# Patient Record
Sex: FEMALE | Race: ASIAN | NOT HISPANIC OR LATINO
[De-identification: names, ages, dates, MRNs, and addresses within clinical notes are randomized per-mention and may not be internally consistent; named-entity substitution may affect disease eponyms.]

---

## 2021-07-30 ENCOUNTER — TRANSCRIPTION ENCOUNTER (OUTPATIENT)
Age: 26
End: 2021-07-30

## 2023-11-23 ENCOUNTER — EMERGENCY (EMERGENCY)
Facility: HOSPITAL | Age: 28
LOS: 1 days | Discharge: HOME | End: 2023-11-23
Attending: EMERGENCY MEDICINE
Payer: COMMERCIAL

## 2023-11-23 VITALS
SYSTOLIC BLOOD PRESSURE: 116 MMHG | WEIGHT: 126.99 LBS | HEART RATE: 62 BPM | TEMPERATURE: 97 F | OXYGEN SATURATION: 98 % | DIASTOLIC BLOOD PRESSURE: 72 MMHG | HEIGHT: 60 IN | RESPIRATION RATE: 16 BRPM

## 2023-11-23 PROCEDURE — 99283 EMERGENCY DEPT VISIT LOW MDM: CPT

## 2023-11-23 PROCEDURE — 99284 EMERGENCY DEPT VISIT MOD MDM: CPT

## 2023-11-23 RX ORDER — ACETAMINOPHEN 500 MG
650 TABLET ORAL ONCE
Refills: 0 | Status: COMPLETED | OUTPATIENT
Start: 2023-11-23 | End: 2023-11-23

## 2023-11-23 RX ORDER — LIDOCAINE 4 G/100G
1 CREAM TOPICAL ONCE
Refills: 0 | Status: COMPLETED | OUTPATIENT
Start: 2023-11-23 | End: 2023-11-23

## 2023-11-23 RX ORDER — IBUPROFEN 200 MG
600 TABLET ORAL ONCE
Refills: 0 | Status: COMPLETED | OUTPATIENT
Start: 2023-11-23 | End: 2023-11-23

## 2023-11-23 RX ADMIN — Medication 600 MILLIGRAM(S): at 10:16

## 2023-11-23 RX ADMIN — Medication 650 MILLIGRAM(S): at 10:16

## 2023-11-23 RX ADMIN — LIDOCAINE 1 PATCH: 4 CREAM TOPICAL at 10:17

## 2023-11-23 NOTE — ED ADULT TRIAGE NOTE - ARRIVAL FROM
Patient cancelled appointment on 4/17/20 with Dr. Dontae Lyons for NPT dyspnea on exertion. Reason: COVID19. Offered VV but pt does not have a smart phone, nor does anyone in his household. Patient did reschedule appointment. Appointment rescheduled for 6/26/20. Work
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
## 2023-11-23 NOTE — ED PROVIDER NOTE - PATIENT PORTAL LINK FT
You can access the FollowMyHealth Patient Portal offered by [facility] by registering at the following website: [email or web address]. By joining [employer]’s FollowMyHealth portal, you will also be able to view your health information using other applications (apps) compatible with our system.
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
## 2023-11-23 NOTE — ED PROVIDER NOTE - PHYSICAL EXAMINATION
Exam:  General: Patient well appearing, vital signs within normal limits  HEENT: airway patent with moist mucous membranes  Cardiac: RRR S1/S2 with strong peripheral pulses  Respiratory: lungs clear without respiratory distress  GI: abdomen soft, non tender, non distended  Neuro: no gross neurologic deficits, CN II-XII intact, strength 5/5 all extremities, sensation to light touch intact  MSK: no midline spinal tenderness, no extremity deformity, normal ambulation  Skin: warm, well perfused  Psych: normal mood and affect

## 2024-04-30 ENCOUNTER — EMERGENCY (EMERGENCY)
Facility: HOSPITAL | Age: 29
LOS: 1 days | Discharge: HOME | End: 2024-04-30
Attending: EMERGENCY MEDICINE | Admitting: EMERGENCY MEDICINE
Payer: COMMERCIAL

## 2024-04-30 VITALS
SYSTOLIC BLOOD PRESSURE: 124 MMHG | OXYGEN SATURATION: 97 % | DIASTOLIC BLOOD PRESSURE: 72 MMHG | TEMPERATURE: 98 F | RESPIRATION RATE: 16 BRPM | HEART RATE: 69 BPM

## 2024-04-30 PROBLEM — Z78.9 OTHER SPECIFIED HEALTH STATUS: Chronic | Status: ACTIVE | Noted: 2023-11-23

## 2024-04-30 LAB
ALBUMIN SERPL ELPH-MCNC: 4.5 G/DL — SIGNIFICANT CHANGE UP (ref 3.3–5)
ALP SERPL-CCNC: 82 U/L — SIGNIFICANT CHANGE UP (ref 40–120)
ALT FLD-CCNC: 16 U/L — SIGNIFICANT CHANGE UP (ref 4–33)
ANION GAP SERPL CALC-SCNC: 13 MMOL/L — SIGNIFICANT CHANGE UP (ref 7–14)
APPEARANCE UR: ABNORMAL
AST SERPL-CCNC: 14 U/L — SIGNIFICANT CHANGE UP (ref 4–32)
BACTERIA # UR AUTO: NEGATIVE /HPF — SIGNIFICANT CHANGE UP
BASOPHILS # BLD AUTO: 0.03 K/UL — SIGNIFICANT CHANGE UP (ref 0–0.2)
BASOPHILS NFR BLD AUTO: 0.5 % — SIGNIFICANT CHANGE UP (ref 0–2)
BILIRUB SERPL-MCNC: 0.4 MG/DL — SIGNIFICANT CHANGE UP (ref 0.2–1.2)
BILIRUB UR-MCNC: NEGATIVE — SIGNIFICANT CHANGE UP
BUN SERPL-MCNC: 10 MG/DL — SIGNIFICANT CHANGE UP (ref 7–23)
CALCIUM SERPL-MCNC: 8.9 MG/DL — SIGNIFICANT CHANGE UP (ref 8.4–10.5)
CAST: 0 /LPF — SIGNIFICANT CHANGE UP (ref 0–4)
CHLORIDE SERPL-SCNC: 102 MMOL/L — SIGNIFICANT CHANGE UP (ref 98–107)
CO2 SERPL-SCNC: 23 MMOL/L — SIGNIFICANT CHANGE UP (ref 22–31)
COLOR SPEC: YELLOW — SIGNIFICANT CHANGE UP
CREAT SERPL-MCNC: 0.65 MG/DL — SIGNIFICANT CHANGE UP (ref 0.5–1.3)
DIFF PNL FLD: ABNORMAL
EGFR: 124 ML/MIN/1.73M2 — SIGNIFICANT CHANGE UP
EOSINOPHIL # BLD AUTO: 0.13 K/UL — SIGNIFICANT CHANGE UP (ref 0–0.5)
EOSINOPHIL NFR BLD AUTO: 2.1 % — SIGNIFICANT CHANGE UP (ref 0–6)
FLUAV AG NPH QL: SIGNIFICANT CHANGE UP
FLUBV AG NPH QL: SIGNIFICANT CHANGE UP
GLUCOSE SERPL-MCNC: 83 MG/DL — SIGNIFICANT CHANGE UP (ref 70–99)
GLUCOSE UR QL: NEGATIVE MG/DL — SIGNIFICANT CHANGE UP
HCG SERPL-ACNC: <1 MIU/ML — SIGNIFICANT CHANGE UP
HCT VFR BLD CALC: 38.2 % — SIGNIFICANT CHANGE UP (ref 34.5–45)
HGB BLD-MCNC: 13 G/DL — SIGNIFICANT CHANGE UP (ref 11.5–15.5)
IANC: 3.19 K/UL — SIGNIFICANT CHANGE UP (ref 1.8–7.4)
IMM GRANULOCYTES NFR BLD AUTO: 0.3 % — SIGNIFICANT CHANGE UP (ref 0–0.9)
KETONES UR-MCNC: NEGATIVE MG/DL — SIGNIFICANT CHANGE UP
LEUKOCYTE ESTERASE UR-ACNC: NEGATIVE — SIGNIFICANT CHANGE UP
LIDOCAIN IGE QN: 20 U/L — SIGNIFICANT CHANGE UP (ref 7–60)
LYMPHOCYTES # BLD AUTO: 2.19 K/UL — SIGNIFICANT CHANGE UP (ref 1–3.3)
LYMPHOCYTES # BLD AUTO: 36.1 % — SIGNIFICANT CHANGE UP (ref 13–44)
MCHC RBC-ENTMCNC: 29.9 PG — SIGNIFICANT CHANGE UP (ref 27–34)
MCHC RBC-ENTMCNC: 34 GM/DL — SIGNIFICANT CHANGE UP (ref 32–36)
MCV RBC AUTO: 87.8 FL — SIGNIFICANT CHANGE UP (ref 80–100)
MONOCYTES # BLD AUTO: 0.5 K/UL — SIGNIFICANT CHANGE UP (ref 0–0.9)
MONOCYTES NFR BLD AUTO: 8.3 % — SIGNIFICANT CHANGE UP (ref 2–14)
NEUTROPHILS # BLD AUTO: 3.19 K/UL — SIGNIFICANT CHANGE UP (ref 1.8–7.4)
NEUTROPHILS NFR BLD AUTO: 52.7 % — SIGNIFICANT CHANGE UP (ref 43–77)
NITRITE UR-MCNC: NEGATIVE — SIGNIFICANT CHANGE UP
NRBC # BLD: 0 /100 WBCS — SIGNIFICANT CHANGE UP (ref 0–0)
NRBC # FLD: 0 K/UL — SIGNIFICANT CHANGE UP (ref 0–0)
PH UR: 6 — SIGNIFICANT CHANGE UP (ref 5–8)
PLATELET # BLD AUTO: 300 K/UL — SIGNIFICANT CHANGE UP (ref 150–400)
POTASSIUM SERPL-MCNC: 3.7 MMOL/L — SIGNIFICANT CHANGE UP (ref 3.5–5.3)
POTASSIUM SERPL-SCNC: 3.7 MMOL/L — SIGNIFICANT CHANGE UP (ref 3.5–5.3)
PROT SERPL-MCNC: 7 G/DL — SIGNIFICANT CHANGE UP (ref 6–8.3)
PROT UR-MCNC: SIGNIFICANT CHANGE UP MG/DL
RBC # BLD: 4.35 M/UL — SIGNIFICANT CHANGE UP (ref 3.8–5.2)
RBC # FLD: 12.4 % — SIGNIFICANT CHANGE UP (ref 10.3–14.5)
RBC CASTS # UR COMP ASSIST: 4 /HPF — SIGNIFICANT CHANGE UP (ref 0–4)
REVIEW: SIGNIFICANT CHANGE UP
RSV RNA NPH QL NAA+NON-PROBE: SIGNIFICANT CHANGE UP
SARS-COV-2 RNA SPEC QL NAA+PROBE: SIGNIFICANT CHANGE UP
SODIUM SERPL-SCNC: 138 MMOL/L — SIGNIFICANT CHANGE UP (ref 135–145)
SP GR SPEC: 1.03 — SIGNIFICANT CHANGE UP (ref 1–1.03)
SQUAMOUS # UR AUTO: 7 /HPF — HIGH (ref 0–5)
UROBILINOGEN FLD QL: 0.2 MG/DL — SIGNIFICANT CHANGE UP (ref 0.2–1)
WBC # BLD: 6.06 K/UL — SIGNIFICANT CHANGE UP (ref 3.8–10.5)
WBC # FLD AUTO: 6.06 K/UL — SIGNIFICANT CHANGE UP (ref 3.8–10.5)
WBC UR QL: 0 /HPF — SIGNIFICANT CHANGE UP (ref 0–5)

## 2024-04-30 PROCEDURE — 93010 ELECTROCARDIOGRAM REPORT: CPT

## 2024-04-30 PROCEDURE — 99284 EMERGENCY DEPT VISIT MOD MDM: CPT

## 2024-04-30 PROCEDURE — 76705 ECHO EXAM OF ABDOMEN: CPT | Mod: 26

## 2024-04-30 RX ORDER — SODIUM CHLORIDE 9 MG/ML
1000 INJECTION INTRAMUSCULAR; INTRAVENOUS; SUBCUTANEOUS ONCE
Refills: 0 | Status: COMPLETED | OUTPATIENT
Start: 2024-04-30 | End: 2024-04-30

## 2024-04-30 RX ORDER — ACETAMINOPHEN 500 MG
650 TABLET ORAL ONCE
Refills: 0 | Status: COMPLETED | OUTPATIENT
Start: 2024-04-30 | End: 2024-04-30

## 2024-04-30 RX ADMIN — Medication 650 MILLIGRAM(S): at 15:27

## 2024-04-30 RX ADMIN — SODIUM CHLORIDE 1000 MILLILITER(S): 9 INJECTION INTRAMUSCULAR; INTRAVENOUS; SUBCUTANEOUS at 15:27

## 2024-04-30 NOTE — ED PROVIDER NOTE - PATIENT PORTAL LINK FT
You can access the FollowMyHealth Patient Portal offered by [facility] by registering at the following website: [email or web address]. By joining [employer]’s FollowMyHealth portal, you will also be able to view your health information using other applications (apps) compatible with our system.

## 2024-04-30 NOTE — ED PROVIDER NOTE - CLINICAL SUMMARY MEDICAL DECISION MAKING FREE TEXT BOX
28 y.o. F no PMHx, no surgeries, presenting with 6 days of intermittent epigastric abdominal pain. Physical exam notable for epigastric tenderness, and mild LUQ tenderness.  Otherwise no focal findings. Given HPI, concerning differentials include but not limited to cholecystitis, biliary colic, gastritis, GERD, PUD is considered however, pt denies hematemesis or melena.  ACS is considered however, pt is young with no cardiac risk factors and HPI without CP or SOB. As such low concern for this etiology at this time. Will obtain RUQ ultrasound to evaluate for possibly biliary etiology. Will obtain screening labs including CBC, CMP to to evaluate for infectious etiology, LFTs. Will obtain lipase to evaluate for pancreatitis. Will provide IV fluids and Tylenol for headache at this time.

## 2024-04-30 NOTE — ED PROVIDER NOTE - ATTENDING CONTRIBUTION TO CARE
DR. BERMEO, ATTENDING MD-  I performed a face to face bedside interview with the patient regarding history of present illness, review of symptoms and past medical history. I completed an independent physical exam.  I have discussed the patient's plan of care with the resident.   Documentation as above in the note.    29 y/o female with epig pain nausea subj fever x5 days.  Eval for pancreatitis gastritis cholecystitis cholelithiasis pregnancy.  Obtain ucg cbc cmp lipase ruq u/s give ivf bolus antiemetic pain med reassess.

## 2024-04-30 NOTE — ED PROVIDER NOTE - NSFOLLOWUPCLINICS_GEN_ALL_ED_FT
Gastroenterology at [facility]  Gastroenterology  [address] [place], [place] [address]  Phone: [phone]  Fax:   Follow Up Time: 1-3 Days

## 2024-04-30 NOTE — ED ADULT NURSE NOTE - CAS EDN DISCHARGE INTERVENTIONS
normal appearance , without tenderness upon palpation , no deformities , trachea midline , Thyroid normal size , no thyroid nodules , no masses , no JVD , thyroid nontender
IV discontinued, cath removed intact

## 2024-04-30 NOTE — ED ADULT TRIAGE NOTE - CHIEF COMPLAINT QUOTE
pt ambulatory into ED c/o epigastric pain x5 days, worse with eating. pt reports 1 episode of fever when symptoms began that has since subsided. also endorses nausea with no vomiting and multiple episodes of soft stool. denies past medical history.

## 2024-04-30 NOTE — ED ADULT NURSE NOTE - OBJECTIVE STATEMENT
28 year old female brought to room 5. pt ambulatory into ED c/o epigastric pain x5 days, worse with eating. pt reports 1 episode of fever when symptoms began that has since subsided. also endorses nausea with no vomiting and multiple episodes of soft stool. denies past medical history.   patient laying in semi fowlers position on the stretcher. patient alert and oriented times four. patient denies shortness of breath, chest pain, nausea, vomiting, chill, fever. Respirations equal and adequate. Patients 20 gauge right a.c  IV patent, no signs of infiltration. Safety measures in place, call bell within reach. Patient stable upon leaving the room.

## 2024-04-30 NOTE — ED PROVIDER NOTE - OBJECTIVE STATEMENT
28 y.o. F no PMHx, no surgeries, presenting with 6 days of intermittent epigastric abdominal pain. Patient states 5 PM on Thursday developing sudden onset of what she describes as "turning/cramping" non-radiating pain. She denies having this pain before. That night, patient endorses subjective fever, took Tylenol with resolution, has not felt febrile since. Does state pain is worse with food, no alleviating factors. Denies rhinorrhea, sore throat, coughing, recent travel or sick contacts, CP or SOB, dysuria, hematuria, diarrhea or blood in stools. Currently does endorse frontal headache.  No other complaints. Currently on menstrual cycle.

## 2024-04-30 NOTE — ED PROVIDER NOTE - PROGRESS NOTE DETAILS
Patient resting comfortably in bed.  Endorsing improvement in her symptoms. work up negative. will d/c. pt amenable to this plan

## 2024-04-30 NOTE — ED PROVIDER NOTE - PHYSICAL EXAMINATION
GEN: Patient awake and alert. No acute distress, non-toxic.  CARDIAC: RRR. Normal S1, S2. No murmur, rubs, or gallops.   PULM: CTA B/L no wheeze, rales or rhonchi. No signs of respiratory distress, no accessory muscle usage or nasal flaring.  ABD: Soft, tender in the epigastrium, nondistended  : No CVA tenderness, no suprapubic tenderness.  MSK: Moving all extremities spontaneously  NEURO: A&Ox3  SKIN: Warm, dry

## 2024-05-15 ENCOUNTER — TRANSCRIPTION ENCOUNTER (OUTPATIENT)
Age: 29
End: 2024-05-15

## 2024-05-15 ENCOUNTER — NON-APPOINTMENT (OUTPATIENT)
Age: 29
End: 2024-05-15

## 2024-05-15 ENCOUNTER — APPOINTMENT (OUTPATIENT)
Dept: INTERNAL MEDICINE | Facility: CLINIC | Age: 29
End: 2024-05-15
Payer: COMMERCIAL

## 2024-05-15 VITALS
WEIGHT: 128 LBS | TEMPERATURE: 98.1 F | OXYGEN SATURATION: 99 % | DIASTOLIC BLOOD PRESSURE: 60 MMHG | HEIGHT: 60 IN | SYSTOLIC BLOOD PRESSURE: 100 MMHG | HEART RATE: 85 BPM | BODY MASS INDEX: 25.13 KG/M2

## 2024-05-15 DIAGNOSIS — R14.0 ABDOMINAL DISTENSION (GASEOUS): ICD-10-CM

## 2024-05-15 DIAGNOSIS — Z13.228 ENCOUNTER FOR SCREENING FOR OTHER METABOLIC DISORDERS: ICD-10-CM

## 2024-05-15 DIAGNOSIS — Z00.00 ENCOUNTER FOR GENERAL ADULT MEDICAL EXAMINATION W/OUT ABNORMAL FINDINGS: ICD-10-CM

## 2024-05-15 DIAGNOSIS — R00.2 PALPITATIONS: ICD-10-CM

## 2024-05-15 DIAGNOSIS — Z13.6 ENCOUNTER FOR SCREENING FOR CARDIOVASCULAR DISORDERS: ICD-10-CM

## 2024-05-15 DIAGNOSIS — Z78.9 OTHER SPECIFIED HEALTH STATUS: ICD-10-CM

## 2024-05-15 DIAGNOSIS — Z12.9 ENCOUNTER FOR SCREENING FOR MALIGNANT NEOPLASM, SITE UNSPECIFIED: ICD-10-CM

## 2024-05-15 DIAGNOSIS — I49.1 ATRIAL PREMATURE DEPOLARIZATION: ICD-10-CM

## 2024-05-15 DIAGNOSIS — Z11.3 ENCOUNTER FOR SCREENING FOR INFECTIONS WITH A PREDOMINANTLY SEXUAL MODE OF TRANSMISSION: ICD-10-CM

## 2024-05-15 DIAGNOSIS — R79.1 ABNORMAL COAGULATION PROFILE: ICD-10-CM

## 2024-05-15 DIAGNOSIS — R23.3 SPONTANEOUS ECCHYMOSES: ICD-10-CM

## 2024-05-15 PROCEDURE — 93000 ELECTROCARDIOGRAM COMPLETE: CPT

## 2024-05-15 PROCEDURE — 99385 PREV VISIT NEW AGE 18-39: CPT

## 2024-05-15 PROCEDURE — 99203 OFFICE O/P NEW LOW 30 MIN: CPT | Mod: 25

## 2024-05-15 RX ORDER — ONDANSETRON 4 MG/1
4 TABLET ORAL
Refills: 0 | Status: ACTIVE | COMMUNITY
Start: 2024-05-15

## 2024-05-15 RX ORDER — PANTOPRAZOLE 40 MG/1
40 TABLET, DELAYED RELEASE ORAL
Qty: 90 | Refills: 1 | Status: ACTIVE | COMMUNITY
Start: 2024-05-15

## 2024-05-15 RX ORDER — FAMOTIDINE 40 MG/1
40 TABLET, FILM COATED ORAL DAILY
Qty: 90 | Refills: 1 | Status: ACTIVE | COMMUNITY
Start: 2024-05-15

## 2024-05-15 RX ORDER — MULTIVIT-MIN/IRON/FOLIC ACID/K 18-600-40
50 MCG CAPSULE ORAL DAILY
Refills: 0 | Status: ACTIVE | COMMUNITY
Start: 2024-05-15

## 2024-05-16 PROBLEM — Z11.3 SCREENING FOR STD (SEXUALLY TRANSMITTED DISEASE): Status: ACTIVE | Noted: 2024-05-15

## 2024-05-16 PROBLEM — Z12.9 CANCER SCREENING: Status: ACTIVE | Noted: 2024-05-15

## 2024-05-16 PROBLEM — Z13.6 SCREENING FOR HEART DISEASE: Status: ACTIVE | Noted: 2024-05-15

## 2024-05-16 PROBLEM — R23.3 EASY BRUISING: Status: ACTIVE | Noted: 2024-05-15

## 2024-05-16 PROBLEM — Z13.228 SCREENING FOR METABOLIC DISORDER: Status: ACTIVE | Noted: 2024-05-15

## 2024-05-16 PROBLEM — R00.2 PALPITATIONS: Status: ACTIVE | Noted: 2024-05-15

## 2024-05-16 PROBLEM — Z00.00 ENCOUNTER FOR PREVENTIVE HEALTH EXAMINATION: Status: ACTIVE | Noted: 2024-05-15

## 2024-05-16 PROBLEM — R14.0 ABDOMINAL BLOATING: Status: ACTIVE | Noted: 2024-05-15

## 2024-05-16 PROBLEM — R79.1 PROLONGED PTT: Status: ACTIVE | Noted: 2024-05-15

## 2024-05-16 PROBLEM — I49.1 PAC (PREMATURE ATRIAL CONTRACTION): Status: ACTIVE | Noted: 2024-05-15

## 2024-05-16 LAB
25(OH)D3 SERPL-MCNC: 20.2 NG/ML
ALBUMIN SERPL ELPH-MCNC: 4.7 G/DL
ALP BLD-CCNC: 83 U/L
ALT SERPL-CCNC: 12 U/L
AMYLASE/CREAT SERPL: 34 U/L
ANION GAP SERPL CALC-SCNC: 13 MMOL/L
APPEARANCE: CLEAR
APTT BLD: 34.6 SEC
AST SERPL-CCNC: 15 U/L
BACTERIA: NEGATIVE /HPF
BASOPHILS # BLD AUTO: 0.03 K/UL
BASOPHILS NFR BLD AUTO: 0.6 %
BILIRUB SERPL-MCNC: 0.5 MG/DL
BILIRUBIN URINE: NEGATIVE
BLOOD URINE: NEGATIVE
BUN SERPL-MCNC: 13 MG/DL
C TRACH RRNA SPEC QL NAA+PROBE: NOT DETECTED
CALCIUM SERPL-MCNC: 9.4 MG/DL
CAST: 0 /LPF
CHLORIDE SERPL-SCNC: 103 MMOL/L
CHOLEST SERPL-MCNC: 158 MG/DL
CK SERPL-CCNC: 124 U/L
CO2 SERPL-SCNC: 22 MMOL/L
COLOR: YELLOW
CREAT SERPL-MCNC: 0.73 MG/DL
CREAT SPEC-SCNC: 99 MG/DL
EGFR: 116 ML/MIN/1.73M2
EOSINOPHIL # BLD AUTO: 0.16 K/UL
EOSINOPHIL NFR BLD AUTO: 3 %
EPITHELIAL CELLS: 2 /HPF
ESTIMATED AVERAGE GLUCOSE: 117 MG/DL
FERRITIN SERPL-MCNC: 28 NG/ML
FOLATE SERPL-MCNC: 12.5 NG/ML
GLUCOSE QUALITATIVE U: NEGATIVE MG/DL
GLUCOSE SERPL-MCNC: 96 MG/DL
HAV IGM SER QL: NONREACTIVE
HBA1C MFR BLD HPLC: 5.7 %
HBV CORE IGM SER QL: NONREACTIVE
HBV SURFACE AG SER QL: NONREACTIVE
HCG SERPL-MCNC: <1 MIU/ML
HCT VFR BLD CALC: 38.3 %
HCV AB SER QL: NONREACTIVE
HCV S/CO RATIO: 0.09 S/CO
HDLC SERPL-MCNC: 65 MG/DL
HGB BLD-MCNC: 12.6 G/DL
HIV1+2 AB SPEC QL IA.RAPID: NONREACTIVE
IMM GRANULOCYTES NFR BLD AUTO: 0.2 %
INR PPP: 0.91 RATIO
IRON SATN MFR SERPL: 40 %
IRON SERPL-MCNC: 119 UG/DL
KETONES URINE: NEGATIVE MG/DL
LDLC SERPL CALC-MCNC: 73 MG/DL
LEUKOCYTE ESTERASE URINE: NEGATIVE
LPL SERPL-CCNC: 21 U/L
LYMPHOCYTES # BLD AUTO: 2.35 K/UL
LYMPHOCYTES NFR BLD AUTO: 43.5 %
MAN DIFF?: NORMAL
MCHC RBC-ENTMCNC: 29.8 PG
MCHC RBC-ENTMCNC: 32.9 GM/DL
MCV RBC AUTO: 90.5 FL
MICROALBUMIN 24H UR DL<=1MG/L-MCNC: <1.2 MG/DL
MICROALBUMIN/CREAT 24H UR-RTO: NORMAL MG/G
MICROSCOPIC-UA: NORMAL
MONOCYTES # BLD AUTO: 0.29 K/UL
MONOCYTES NFR BLD AUTO: 5.4 %
N GONORRHOEA RRNA SPEC QL NAA+PROBE: NOT DETECTED
NEUTROPHILS # BLD AUTO: 2.56 K/UL
NEUTROPHILS NFR BLD AUTO: 47.3 %
NITRITE URINE: NEGATIVE
NONHDLC SERPL-MCNC: 93 MG/DL
PH URINE: 6.5
PLATELET # BLD AUTO: 317 K/UL
POTASSIUM SERPL-SCNC: 4.5 MMOL/L
PROT SERPL-MCNC: 7.3 G/DL
PROTEIN URINE: NEGATIVE MG/DL
PT BLD: 10.3 SEC
RBC # BLD: 4.23 M/UL
RBC # FLD: 13.5 %
RED BLOOD CELLS URINE: 1 /HPF
SODIUM SERPL-SCNC: 139 MMOL/L
SOURCE AMPLIFICATION: NORMAL
SPECIFIC GRAVITY URINE: 1.02
T4 FREE SERPL-MCNC: 1.3 NG/DL
TIBC SERPL-MCNC: 296 UG/DL
TRIGL SERPL-MCNC: 110 MG/DL
TSH SERPL-ACNC: 1.54 UIU/ML
UIBC SERPL-MCNC: 178 UG/DL
URATE SERPL-MCNC: 5.4 MG/DL
UROBILINOGEN URINE: 0.2 MG/DL
VIT B12 SERPL-MCNC: 248 PG/ML
WBC # FLD AUTO: 5.4 K/UL
WHITE BLOOD CELLS URINE: 0 /HPF

## 2024-05-16 RX ORDER — ERGOCALCIFEROL 1.25 MG/1
1.25 MG CAPSULE, LIQUID FILLED ORAL
Qty: 8 | Refills: 0 | Status: ACTIVE | COMMUNITY
Start: 2024-05-16 | End: 1900-01-01

## 2024-05-16 NOTE — HEALTH RISK ASSESSMENT
[0] : 2) Feeling down, depressed, or hopeless: Not at all (0) [PHQ-2 Negative - No further assessment needed] : PHQ-2 Negative - No further assessment needed [Never] : Never [[id number]] : 0

## 2024-05-16 NOTE — HISTORY OF PRESENT ILLNESS
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 Reports chronic abdominal bloating, was supposed to gi at [facility] never did, takes zofran, pantoprazole prn. Pt reports easy bleeding during dental cleaning, brushing teeth. Says her PCP check PTT in past and it was high. Pt reports several cousins who  from massive bleeding in the setting of surgery. She never saw heme before Reports APS, did extensive cardiology workup with [facility] Dr Cotton about 6 months ago with holter showing PACS, TTE and EST normal  Denies chest pain, sob, garcia, dizziness, orthopnea, diaphoresis, palpitations, LE swelling, orthopnea, syncope, n/v, headache. Reports going to ER 2 weeks ago for abdominal pain bloodwork and RUQ sono were negative.

## 2024-05-17 LAB
GLIADIN IGA SER QL: <5 UNITS
GLIADIN IGG SER QL: <5 UNITS
GLIADIN PEPTIDE IGA SER-ACNC: NEGATIVE
GLIADIN PEPTIDE IGG SER-ACNC: NEGATIVE
TTG IGA SER IA-ACNC: <1.2 U/ML
TTG IGA SER-ACNC: NEGATIVE
TTG IGG SER IA-ACNC: 1.2 U/ML
TTG IGG SER IA-ACNC: NEGATIVE

## 2024-05-20 LAB
ENDOMYSIUM IGA SER QL: NEGATIVE
ENDOMYSIUM IGA TITR SER: NORMAL

## 2024-05-21 LAB
HSV 1+2 IGG SER IA-IMP: NEGATIVE
HSV 1+2 IGG SER IA-IMP: NEGATIVE
HSV1 IGG SER QL: 0.08 INDEX
HSV2 IGG SER QL: 0.05 INDEX
T PALLIDUM AB SER QL IA: NEGATIVE

## 2024-07-15 ENCOUNTER — APPOINTMENT (OUTPATIENT)
Dept: GASTROENTEROLOGY | Facility: CLINIC | Age: 29
End: 2024-07-15
Payer: COMMERCIAL

## 2024-07-15 VITALS
TEMPERATURE: 98.2 F | OXYGEN SATURATION: 98 % | RESPIRATION RATE: 16 BRPM | WEIGHT: 128 LBS | BODY MASS INDEX: 25.13 KG/M2 | HEIGHT: 60 IN | SYSTOLIC BLOOD PRESSURE: 111 MMHG | DIASTOLIC BLOOD PRESSURE: 72 MMHG | HEART RATE: 60 BPM

## 2024-07-15 DIAGNOSIS — R10.9 UNSPECIFIED ABDOMINAL PAIN: ICD-10-CM

## 2024-07-15 DIAGNOSIS — Z78.9 OTHER SPECIFIED HEALTH STATUS: ICD-10-CM

## 2024-07-15 DIAGNOSIS — Z80.3 FAMILY HISTORY OF MALIGNANT NEOPLASM OF BREAST: ICD-10-CM

## 2024-07-15 DIAGNOSIS — R52 PAIN, UNSPECIFIED: ICD-10-CM

## 2024-07-15 PROCEDURE — 99204 OFFICE O/P NEW MOD 45 MIN: CPT

## 2024-08-22 ENCOUNTER — APPOINTMENT (OUTPATIENT)
Dept: ULTRASOUND IMAGING | Facility: CLINIC | Age: 29
End: 2024-08-22
Payer: COMMERCIAL

## 2024-08-22 PROCEDURE — 76641 ULTRASOUND BREAST COMPLETE: CPT | Mod: 26,50

## 2024-09-27 ENCOUNTER — NON-APPOINTMENT (OUTPATIENT)
Age: 29
End: 2024-09-27

## 2024-10-14 ENCOUNTER — OUTPATIENT (OUTPATIENT)
Dept: OUTPATIENT SERVICES | Facility: HOSPITAL | Age: 29
LOS: 1 days | Discharge: HOME | End: 2024-10-14

## 2024-10-14 DIAGNOSIS — N64.4 MASTODYNIA: ICD-10-CM

## 2024-10-22 ENCOUNTER — APPOINTMENT (OUTPATIENT)
Dept: HEMATOLOGY ONCOLOGY | Facility: CLINIC | Age: 29
End: 2024-10-22
Payer: COMMERCIAL

## 2024-10-22 VITALS
RESPIRATION RATE: 16 BRPM | TEMPERATURE: 97.4 F | HEART RATE: 53 BPM | SYSTOLIC BLOOD PRESSURE: 111 MMHG | BODY MASS INDEX: 24.66 KG/M2 | WEIGHT: 122.33 LBS | OXYGEN SATURATION: 99 % | HEIGHT: 59.06 IN | DIASTOLIC BLOOD PRESSURE: 69 MMHG

## 2024-10-22 DIAGNOSIS — E28.2 POLYCYSTIC OVARIAN SYNDROME: ICD-10-CM

## 2024-10-22 DIAGNOSIS — R23.3 SPONTANEOUS ECCHYMOSES: ICD-10-CM

## 2024-10-22 LAB
APTT BLD: 30.5 SEC
INR PPP: 0.94 RATIO
PT BLD: 11.2 SEC
VWF AG PPP IA-ACNC: 81 %
VWF:RCO ACT/NOR PPP PL AGG: 68 %

## 2024-10-22 PROCEDURE — 99205 OFFICE O/P NEW HI 60 MIN: CPT

## 2024-10-23 PROBLEM — E28.2 PCOS (POLYCYSTIC OVARIAN SYNDROME): Status: RESOLVED | Noted: 2024-10-23 | Resolved: 2024-10-23

## 2024-12-10 ENCOUNTER — APPOINTMENT (OUTPATIENT)
Dept: HUMAN REPRODUCTION | Facility: CLINIC | Age: 29
End: 2024-12-10
Payer: COMMERCIAL

## 2024-12-10 PROCEDURE — 36415 COLL VENOUS BLD VENIPUNCTURE: CPT

## 2024-12-10 PROCEDURE — 76830 TRANSVAGINAL US NON-OB: CPT

## 2024-12-10 PROCEDURE — 99205 OFFICE O/P NEW HI 60 MIN: CPT | Mod: 25

## 2024-12-10 PROCEDURE — 99459 PELVIC EXAMINATION: CPT

## 2024-12-17 ENCOUNTER — APPOINTMENT (OUTPATIENT)
Dept: HUMAN REPRODUCTION | Facility: CLINIC | Age: 29
End: 2024-12-17
Payer: COMMERCIAL

## 2024-12-17 PROCEDURE — 76831 ECHO EXAM UTERUS: CPT

## 2024-12-17 PROCEDURE — 74740 X-RAY FEMALE GENITAL TRACT: CPT

## 2024-12-17 PROCEDURE — 58999I: CUSTOM

## 2024-12-17 PROCEDURE — 99459 PELVIC EXAMINATION: CPT

## 2024-12-17 PROCEDURE — 99214 OFFICE O/P EST MOD 30 MIN: CPT | Mod: 25

## 2024-12-17 PROCEDURE — 58340 CATHETER FOR HYSTEROGRAPHY: CPT

## 2025-01-07 ENCOUNTER — APPOINTMENT (OUTPATIENT)
Dept: HUMAN REPRODUCTION | Facility: CLINIC | Age: 30
End: 2025-01-07

## 2025-01-21 ENCOUNTER — APPOINTMENT (OUTPATIENT)
Dept: HUMAN REPRODUCTION | Facility: CLINIC | Age: 30
End: 2025-01-21
Payer: COMMERCIAL

## 2025-01-21 PROCEDURE — 99215 OFFICE O/P EST HI 40 MIN: CPT

## 2025-02-09 ENCOUNTER — EMERGENCY (EMERGENCY)
Facility: HOSPITAL | Age: 30
LOS: 1 days | Discharge: HOME | End: 2025-02-09
Attending: EMERGENCY MEDICINE
Payer: COMMERCIAL

## 2025-02-09 VITALS
TEMPERATURE: 98 F | SYSTOLIC BLOOD PRESSURE: 133 MMHG | DIASTOLIC BLOOD PRESSURE: 89 MMHG | RESPIRATION RATE: 16 BRPM | WEIGHT: 115.96 LBS | HEART RATE: 67 BPM | OXYGEN SATURATION: 100 %

## 2025-02-09 VITALS — HEART RATE: 60 BPM

## 2025-02-09 LAB
ALBUMIN SERPL ELPH-MCNC: 3.6 G/DL — SIGNIFICANT CHANGE UP (ref 3.5–5)
ALP SERPL-CCNC: 69 U/L — SIGNIFICANT CHANGE UP (ref 40–120)
ALT FLD-CCNC: 18 U/L DA — SIGNIFICANT CHANGE UP (ref 10–60)
ANION GAP SERPL CALC-SCNC: 4 MMOL/L — LOW (ref 5–17)
APPEARANCE UR: CLEAR — SIGNIFICANT CHANGE UP
AST SERPL-CCNC: 12 U/L — SIGNIFICANT CHANGE UP (ref 10–40)
BACTERIA # UR AUTO: ABNORMAL /HPF
BASOPHILS # BLD AUTO: 0.03 K/UL — SIGNIFICANT CHANGE UP (ref 0–0.2)
BASOPHILS NFR BLD AUTO: 0.4 % — SIGNIFICANT CHANGE UP (ref 0–2)
BILIRUB SERPL-MCNC: 0.2 MG/DL — SIGNIFICANT CHANGE UP (ref 0.2–1.2)
BILIRUB UR-MCNC: NEGATIVE — SIGNIFICANT CHANGE UP
BUN SERPL-MCNC: 13 MG/DL — SIGNIFICANT CHANGE UP (ref 7–18)
CALCIUM SERPL-MCNC: 9.2 MG/DL — SIGNIFICANT CHANGE UP (ref 8.4–10.5)
CHLORIDE SERPL-SCNC: 105 MMOL/L — SIGNIFICANT CHANGE UP (ref 96–108)
CO2 SERPL-SCNC: 26 MMOL/L — SIGNIFICANT CHANGE UP (ref 22–31)
COLOR SPEC: YELLOW — SIGNIFICANT CHANGE UP
CREAT SERPL-MCNC: 0.69 MG/DL — SIGNIFICANT CHANGE UP (ref 0.5–1.3)
DIFF PNL FLD: ABNORMAL
EGFR: 122 ML/MIN/1.73M2 — SIGNIFICANT CHANGE UP
EOSINOPHIL # BLD AUTO: 0.08 K/UL — SIGNIFICANT CHANGE UP (ref 0–0.5)
EOSINOPHIL NFR BLD AUTO: 1.2 % — SIGNIFICANT CHANGE UP (ref 0–6)
EPI CELLS # UR: PRESENT
GLUCOSE SERPL-MCNC: 129 MG/DL — HIGH (ref 70–99)
GLUCOSE UR QL: NEGATIVE MG/DL — SIGNIFICANT CHANGE UP
HCG SERPL-ACNC: <1 MIU/ML — SIGNIFICANT CHANGE UP
HCT VFR BLD CALC: 35.7 % — SIGNIFICANT CHANGE UP (ref 34.5–45)
HGB BLD-MCNC: 12 G/DL — SIGNIFICANT CHANGE UP (ref 11.5–15.5)
IMM GRANULOCYTES NFR BLD AUTO: 0.1 % — SIGNIFICANT CHANGE UP (ref 0–0.9)
KETONES UR-MCNC: NEGATIVE MG/DL — SIGNIFICANT CHANGE UP
LEUKOCYTE ESTERASE UR-ACNC: NEGATIVE — SIGNIFICANT CHANGE UP
LIDOCAIN IGE QN: 30 U/L — SIGNIFICANT CHANGE UP (ref 13–75)
LYMPHOCYTES # BLD AUTO: 2.25 K/UL — SIGNIFICANT CHANGE UP (ref 1–3.3)
LYMPHOCYTES # BLD AUTO: 33.4 % — SIGNIFICANT CHANGE UP (ref 13–44)
MAGNESIUM SERPL-MCNC: 2.2 MG/DL — SIGNIFICANT CHANGE UP (ref 1.6–2.6)
MCHC RBC-ENTMCNC: 29.7 PG — SIGNIFICANT CHANGE UP (ref 27–34)
MCHC RBC-ENTMCNC: 33.6 G/DL — SIGNIFICANT CHANGE UP (ref 32–36)
MCV RBC AUTO: 88.4 FL — SIGNIFICANT CHANGE UP (ref 80–100)
MONOCYTES # BLD AUTO: 0.4 K/UL — SIGNIFICANT CHANGE UP (ref 0–0.9)
MONOCYTES NFR BLD AUTO: 5.9 % — SIGNIFICANT CHANGE UP (ref 2–14)
NEUTROPHILS # BLD AUTO: 3.97 K/UL — SIGNIFICANT CHANGE UP (ref 1.8–7.4)
NEUTROPHILS NFR BLD AUTO: 59 % — SIGNIFICANT CHANGE UP (ref 43–77)
NITRITE UR-MCNC: NEGATIVE — SIGNIFICANT CHANGE UP
NRBC # BLD: 0 /100 WBCS — SIGNIFICANT CHANGE UP (ref 0–0)
NRBC BLD-RTO: 0 /100 WBCS — SIGNIFICANT CHANGE UP (ref 0–0)
NT-PROBNP SERPL-SCNC: 81 PG/ML — SIGNIFICANT CHANGE UP (ref 0–125)
PH UR: 6 — SIGNIFICANT CHANGE UP (ref 5–8)
PLATELET # BLD AUTO: 302 K/UL — SIGNIFICANT CHANGE UP (ref 150–400)
POTASSIUM SERPL-MCNC: 3.6 MMOL/L — SIGNIFICANT CHANGE UP (ref 3.5–5.3)
POTASSIUM SERPL-SCNC: 3.6 MMOL/L — SIGNIFICANT CHANGE UP (ref 3.5–5.3)
PROT SERPL-MCNC: 7.2 G/DL — SIGNIFICANT CHANGE UP (ref 6–8.3)
PROT UR-MCNC: NEGATIVE MG/DL — SIGNIFICANT CHANGE UP
RBC # BLD: 4.04 M/UL — SIGNIFICANT CHANGE UP (ref 3.8–5.2)
RBC # FLD: 12.9 % — SIGNIFICANT CHANGE UP (ref 10.3–14.5)
RBC CASTS # UR COMP ASSIST: 1 /HPF — SIGNIFICANT CHANGE UP (ref 0–4)
SODIUM SERPL-SCNC: 135 MMOL/L — SIGNIFICANT CHANGE UP (ref 135–145)
SP GR SPEC: 1.02 — SIGNIFICANT CHANGE UP (ref 1–1.03)
TROPONIN I, HIGH SENSITIVITY RESULT: 19.7 NG/L — SIGNIFICANT CHANGE UP
TSH SERPL-MCNC: 2.51 UU/ML — SIGNIFICANT CHANGE UP (ref 0.34–4.82)
UROBILINOGEN FLD QL: 0.2 MG/DL — SIGNIFICANT CHANGE UP (ref 0.2–1)
WBC # BLD: 6.74 K/UL — SIGNIFICANT CHANGE UP (ref 3.8–10.5)
WBC # FLD AUTO: 6.74 K/UL — SIGNIFICANT CHANGE UP (ref 3.8–10.5)
WBC UR QL: SIGNIFICANT CHANGE UP /HPF (ref 0–5)

## 2025-02-09 PROCEDURE — 84443 ASSAY THYROID STIM HORMONE: CPT

## 2025-02-09 PROCEDURE — 81001 URINALYSIS AUTO W/SCOPE: CPT

## 2025-02-09 PROCEDURE — 84484 ASSAY OF TROPONIN QUANT: CPT

## 2025-02-09 PROCEDURE — 99284 EMERGENCY DEPT VISIT MOD MDM: CPT

## 2025-02-09 PROCEDURE — 83880 ASSAY OF NATRIURETIC PEPTIDE: CPT

## 2025-02-09 PROCEDURE — 85025 COMPLETE CBC W/AUTO DIFF WBC: CPT

## 2025-02-09 PROCEDURE — 83690 ASSAY OF LIPASE: CPT

## 2025-02-09 PROCEDURE — 83735 ASSAY OF MAGNESIUM: CPT

## 2025-02-09 PROCEDURE — 36415 COLL VENOUS BLD VENIPUNCTURE: CPT

## 2025-02-09 PROCEDURE — 93005 ELECTROCARDIOGRAM TRACING: CPT

## 2025-02-09 PROCEDURE — 82962 GLUCOSE BLOOD TEST: CPT

## 2025-02-09 PROCEDURE — 84702 CHORIONIC GONADOTROPIN TEST: CPT

## 2025-02-09 PROCEDURE — 80053 COMPREHEN METABOLIC PANEL: CPT

## 2025-02-09 PROCEDURE — 93010 ELECTROCARDIOGRAM REPORT: CPT

## 2025-02-09 NOTE — ED PROVIDER NOTE - CARE PROVIDER_API CALL
Daphne Bocanegra  Cardiology  [address] [address]  Phone: [phone]  Fax: [phone]  Follow Up Time:

## 2025-02-09 NOTE — ED ADULT TRIAGE NOTE - CHIEF COMPLAINT QUOTE
Palpitations on and off x 4 days ,near syncopal episode 2 weeks ago ,seen by cardiologist ,h/o PVC's

## 2025-02-09 NOTE — ED PROVIDER NOTE - PROGRESS NOTE DETAILS
Patient with occasional PAC otherwise no arrhythmia on monitor.  Discussed close follow-up with PCP/cardiology referral for Holter placement.  Educated on signs and symptoms to return sooner to ED.

## 2025-02-09 NOTE — ED PROVIDER NOTE - PATIENT PORTAL LINK FT
You can access the FollowMyHealth Patient Portal offered by [facility] by registering at the following website: [email or web address]. By joining [employer]’s FollowMyHealth portal, you will also be able to view your health information using other applications (apps) compatible with our system.

## 2025-02-09 NOTE — ED PROVIDER NOTE - NSFOLLOWUPINSTRUCTIONS_ED_ALL_ED_FT
Premature Atrial Contractions    WHAT YOU NEED TO KNOW:    What are premature atrial contractions (PACs)? PACs are an interruption in your heart rhythm. PACs happen when your heart gets an early signal to pump. PACs are common and usually have no cause. Most people have skipped heartbeats from time to time. Follow up with your healthcare provider so the cause of your PAC can be diagnosed and treated.    What increases my risk for PACs?    Stress or fatigue    Caffeine, alcohol, or tobacco    Medical conditions, such as high blood pressure, heart disease, or hyperthyroidism    Pregnancy  What are the signs and symptoms of PACs?    Palpitations (fast, forceful heartbeats in an irregular rhythm)    A missed or skipped heartbeat    Chest pain or shortness of breath    Lightheadedness, dizziness, or feeling faint    Tiredness with exercise or activity  How are PACs diagnosed? Your healthcare provider will ask if you have a family history of heart problems. You may also need any of the following:    An EKG records your heart rhythm and how fast your heart beats. It is used to check for PACs.    A Holter monitor is a device that you wear for a period of time. It records how fast your heart beats, and if it beats in a regular pattern. You may need to wear it up to 72 hours. This will show how frequent your PACs are during your normal daily activities.  Holter Monitor  How are PACs treated? PACs usually do not need treatment. You may be given medicine to strengthen or regulate your heartbeat.    How can I help prevent more PACs?    Do not have alcohol or caffeine. These can increase your PACs.    Do not smoke. Nicotine and other chemicals in cigarettes and cigars can increase heart problems. Ask your healthcare provider for information if you currently smoke and need help to quit. E-cigarettes or smokeless tobacco still contain nicotine. Talk to your healthcare provider before you use these products.    Exercise as directed. Exercise helps keep your heart healthy. Ask your healthcare provider about the best exercise plan for you.  Call 911 for any of the following: You have any of the following signs of a heart attack:    Squeezing, pressure, or pain in your chest    You may also have any of the following:  Discomfort or pain in your back, neck, jaw, stomach, or arm    Shortness of breath    Nausea or vomiting    Lightheadedness or a sudden cold sweat  When should I contact my healthcare provider?    Your symptoms do not go away, or they get worse.    You have questions or concerns about your condition or care.  CARE AGREEMENT:    You have the right to help plan your care. Learn about your health condition and how it may be treated. Discuss treatment options with your healthcare providers to decide what care you want to receive. You always have the right to refuse treatment.    © [employer] 1973, 2025

## 2025-02-09 NOTE — ED ADULT NURSE NOTE - CAS TRG GENERAL AIRWAY, MLM
BG goal 140-180 while inpatient    Will continue IV intensive protocol requiring intensive BG monitoring q1hr.   Patient does not have history of DM.  Will likely step down quickly once clinically more stable.     Patent

## 2025-02-09 NOTE — ED PROVIDER NOTE - PSYCHIATRIC, MLM
Alert and oriented to person, place, time/situation. normal mood and affect. no apparent risk to self or others. [Follow-Up - Clinic] : a clinic follow-up of [Hypertension] : hypertension [Medication Management] : Medication management

## 2025-02-09 NOTE — ED PROVIDER NOTE - CLINICAL SUMMARY MEDICAL DECISION MAKING FREE TEXT BOX
29-year-old female with history of PCOS on OCP here for palpitations.  Currently stable vitals and currently not symptomatic.  History of PACs.  Plan to check EKG, placed on cardiac monitor, check labs including TSH and reassess.  Anticipate cardiology follow-up for Holter monitor placement if results unremarkable.

## 2025-02-09 NOTE — ED PROVIDER NOTE - OBJECTIVE STATEMENT
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  Reports palpitations feeling as "heart fluttering" to mid chest and left side.  Onset randomly but more often when she exerts herself.  States that overall symptoms have become more frequent over the past week and a half.  Overall has experienced them for the past 2 years.  Had normal EST, echo performed by cardiologist in [facility] last year.  Was diagnosed with PACs but has not initiated any treatment due to baseline low blood pressure.  Reports feeling lightheaded and dizzy once with symptoms about a month ago.  Otherwise denies any associated symptoms.  Denies having any chest pain, leg pain, legs swelling, fever, cough, abdominal pain or headache.

## 2025-02-11 ENCOUNTER — NON-APPOINTMENT (OUTPATIENT)
Age: 30
End: 2025-02-11

## 2025-02-11 ENCOUNTER — OUTPATIENT (OUTPATIENT)
Dept: OUTPATIENT SERVICES | Facility: HOSPITAL | Age: 30
LOS: 1 days | Discharge: HOME | End: 2025-02-11

## 2025-02-11 DIAGNOSIS — N64.4 MASTODYNIA: ICD-10-CM

## 2025-03-26 ENCOUNTER — APPOINTMENT (OUTPATIENT)
Dept: HEMATOLOGY ONCOLOGY | Facility: CLINIC | Age: 30
End: 2025-03-26

## 2025-03-31 ENCOUNTER — APPOINTMENT (OUTPATIENT)
Dept: HEMATOLOGY ONCOLOGY | Facility: CLINIC | Age: 30
End: 2025-03-31

## 2025-04-14 ENCOUNTER — NON-APPOINTMENT (OUTPATIENT)
Age: 30
End: 2025-04-14

## 2025-04-15 ENCOUNTER — APPOINTMENT (OUTPATIENT)
Dept: CARDIOLOGY | Facility: CLINIC | Age: 30
End: 2025-04-15
Payer: COMMERCIAL

## 2025-04-15 VITALS
TEMPERATURE: 97.6 F | HEART RATE: 73 BPM | DIASTOLIC BLOOD PRESSURE: 62 MMHG | WEIGHT: 123 LBS | BODY MASS INDEX: 24.8 KG/M2 | HEIGHT: 59 IN | SYSTOLIC BLOOD PRESSURE: 100 MMHG | OXYGEN SATURATION: 98 %

## 2025-04-15 DIAGNOSIS — Z13.228 ENCOUNTER FOR SCREENING FOR OTHER METABOLIC DISORDERS: ICD-10-CM

## 2025-04-15 DIAGNOSIS — I49.1 ATRIAL PREMATURE DEPOLARIZATION: ICD-10-CM

## 2025-04-15 DIAGNOSIS — E55.9 VITAMIN D DEFICIENCY, UNSPECIFIED: ICD-10-CM

## 2025-04-15 DIAGNOSIS — Z82.49 FAMILY HISTORY OF ISCHEMIC HEART DISEASE AND OTHER DISEASES OF THE CIRCULATORY SYSTEM: ICD-10-CM

## 2025-04-15 DIAGNOSIS — R42 DIZZINESS AND GIDDINESS: ICD-10-CM

## 2025-04-15 DIAGNOSIS — R00.2 PALPITATIONS: ICD-10-CM

## 2025-04-15 DIAGNOSIS — E28.2 POLYCYSTIC OVARIAN SYNDROME: ICD-10-CM

## 2025-04-15 DIAGNOSIS — R06.00 DYSPNEA, UNSPECIFIED: ICD-10-CM

## 2025-04-15 PROCEDURE — 99204 OFFICE O/P NEW MOD 45 MIN: CPT

## 2025-04-15 PROCEDURE — 93000 ELECTROCARDIOGRAM COMPLETE: CPT

## 2025-04-15 RX ORDER — DROSPIRENONE AND ETHINYL ESTRADIOL 0.02-3(28)
3-0.02 KIT ORAL
Refills: 0 | Status: ACTIVE | COMMUNITY

## 2025-04-16 LAB
25(OH)D3 SERPL-MCNC: 42.9 NG/ML
ALBUMIN SERPL ELPH-MCNC: 4.1 G/DL
ALP BLD-CCNC: 56 U/L
ALT SERPL-CCNC: 8 U/L
ANION GAP SERPL CALC-SCNC: 13 MMOL/L
APPEARANCE: CLEAR
AST SERPL-CCNC: 11 U/L
BACTERIA: ABNORMAL /HPF
BASOPHILS # BLD AUTO: 0.04 K/UL
BASOPHILS NFR BLD AUTO: 0.6 %
BILIRUB SERPL-MCNC: 0.3 MG/DL
BILIRUBIN URINE: NEGATIVE
BLOOD URINE: NEGATIVE
BUN SERPL-MCNC: 11 MG/DL
CALCIUM SERPL-MCNC: 9.7 MG/DL
CAST: 0 /LPF
CHLORIDE SERPL-SCNC: 105 MMOL/L
CHOLEST SERPL-MCNC: 148 MG/DL
CO2 SERPL-SCNC: 22 MMOL/L
COLOR: YELLOW
CREAT SERPL-MCNC: 0.74 MG/DL
EGFRCR SERPLBLD CKD-EPI 2021: 114 ML/MIN/1.73M2
EOSINOPHIL # BLD AUTO: 0.13 K/UL
EOSINOPHIL NFR BLD AUTO: 2.1 %
EPITHELIAL CELLS: 2 /HPF
ESTIMATED AVERAGE GLUCOSE: 114 MG/DL
GLUCOSE QUALITATIVE U: NEGATIVE MG/DL
GLUCOSE SERPL-MCNC: 85 MG/DL
HBA1C MFR BLD HPLC: 5.6 %
HCG SERPL-MCNC: <1 MIU/ML
HCT VFR BLD CALC: 36.4 %
HDLC SERPL-MCNC: 72 MG/DL
HGB BLD-MCNC: 12.1 G/DL
IMM GRANULOCYTES NFR BLD AUTO: 0.3 %
KETONES URINE: NEGATIVE MG/DL
LDLC SERPL-MCNC: 58 MG/DL
LEUKOCYTE ESTERASE URINE: NEGATIVE
LYMPHOCYTES # BLD AUTO: 2.95 K/UL
LYMPHOCYTES NFR BLD AUTO: 47.3 %
MAN DIFF?: NORMAL
MCHC RBC-ENTMCNC: 29.9 PG
MCHC RBC-ENTMCNC: 33.2 G/DL
MCV RBC AUTO: 89.9 FL
MICROSCOPIC-UA: NORMAL
MONOCYTES # BLD AUTO: 0.27 K/UL
MONOCYTES NFR BLD AUTO: 4.3 %
NEUTROPHILS # BLD AUTO: 2.83 K/UL
NEUTROPHILS NFR BLD AUTO: 45.4 %
NITRITE URINE: NEGATIVE
NONHDLC SERPL-MCNC: 76 MG/DL
PH URINE: 7.5
PLATELET # BLD AUTO: 317 K/UL
POTASSIUM SERPL-SCNC: 4.1 MMOL/L
PROT SERPL-MCNC: 6.6 G/DL
PROTEIN URINE: NEGATIVE MG/DL
RBC # BLD: 4.05 M/UL
RBC # FLD: 13 %
RED BLOOD CELLS URINE: 4 /HPF
SODIUM SERPL-SCNC: 139 MMOL/L
SPECIFIC GRAVITY URINE: 1.02
T3FREE SERPL-MCNC: 3.3 PG/ML
T4 FREE SERPL-MCNC: 1.4 NG/DL
TRIGL SERPL-MCNC: 97 MG/DL
TSH SERPL-ACNC: 1.85 UIU/ML
UROBILINOGEN URINE: 1 MG/DL
WBC # FLD AUTO: 6.24 K/UL
WHITE BLOOD CELLS URINE: 0 /HPF

## 2025-04-28 ENCOUNTER — APPOINTMENT (OUTPATIENT)
Dept: CARDIOLOGY | Facility: CLINIC | Age: 30
End: 2025-04-28
Payer: COMMERCIAL

## 2025-04-28 DIAGNOSIS — R94.39 ABNORMAL RESULT OF OTHER CARDIOVASCULAR FUNCTION STUDY: ICD-10-CM

## 2025-04-28 DIAGNOSIS — R94.31 ABNORMAL ELECTROCARDIOGRAM [ECG] [EKG]: ICD-10-CM

## 2025-04-28 PROCEDURE — 93015 CV STRESS TEST SUPVJ I&R: CPT

## 2025-04-28 PROCEDURE — 93306 TTE W/DOPPLER COMPLETE: CPT

## 2025-05-05 ENCOUNTER — APPOINTMENT (OUTPATIENT)
Dept: CARDIOLOGY | Facility: CLINIC | Age: 30
End: 2025-05-05
Payer: COMMERCIAL

## 2025-05-05 PROCEDURE — 93241 XTRNL ECG REC>48HR<7D: CPT

## 2025-05-05 PROCEDURE — 93351 STRESS TTE COMPLETE: CPT

## 2025-05-06 ENCOUNTER — NON-APPOINTMENT (OUTPATIENT)
Age: 30
End: 2025-05-06

## 2025-05-14 ENCOUNTER — APPOINTMENT (OUTPATIENT)
Dept: CARDIOLOGY | Facility: CLINIC | Age: 30
End: 2025-05-14
Payer: COMMERCIAL

## 2025-05-14 VITALS
HEART RATE: 59 BPM | WEIGHT: 127 LBS | TEMPERATURE: 98.1 F | HEIGHT: 59 IN | BODY MASS INDEX: 25.6 KG/M2 | OXYGEN SATURATION: 99 % | DIASTOLIC BLOOD PRESSURE: 60 MMHG | SYSTOLIC BLOOD PRESSURE: 90 MMHG

## 2025-05-14 DIAGNOSIS — I49.1 ATRIAL PREMATURE DEPOLARIZATION: ICD-10-CM

## 2025-05-14 DIAGNOSIS — R42 DIZZINESS AND GIDDINESS: ICD-10-CM

## 2025-05-14 DIAGNOSIS — D22.9 MELANOCYTIC NEVI, UNSPECIFIED: ICD-10-CM

## 2025-05-14 DIAGNOSIS — R06.00 DYSPNEA, UNSPECIFIED: ICD-10-CM

## 2025-05-14 DIAGNOSIS — E28.2 POLYCYSTIC OVARIAN SYNDROME: ICD-10-CM

## 2025-05-14 DIAGNOSIS — Z01.818 ENCOUNTER FOR OTHER PREPROCEDURAL EXAMINATION: ICD-10-CM

## 2025-05-14 DIAGNOSIS — R00.2 PALPITATIONS: ICD-10-CM

## 2025-05-14 DIAGNOSIS — R82.90 UNSPECIFIED ABNORMAL FINDINGS IN URINE: ICD-10-CM

## 2025-05-14 PROCEDURE — G2211 COMPLEX E/M VISIT ADD ON: CPT

## 2025-05-14 PROCEDURE — 99214 OFFICE O/P EST MOD 30 MIN: CPT

## 2025-05-14 PROCEDURE — 93000 ELECTROCARDIOGRAM COMPLETE: CPT | Mod: NC

## 2025-05-15 ENCOUNTER — NON-APPOINTMENT (OUTPATIENT)
Age: 30
End: 2025-05-15

## 2025-05-15 LAB
ANION GAP SERPL CALC-SCNC: 16 MMOL/L
APPEARANCE: CLEAR
BACTERIA: NEGATIVE /HPF
BASOPHILS # BLD AUTO: 0.04 K/UL
BASOPHILS NFR BLD AUTO: 0.5 %
BILIRUBIN URINE: NEGATIVE
BLOOD URINE: NEGATIVE
BUN SERPL-MCNC: 11 MG/DL
CALCIUM SERPL-MCNC: 10.4 MG/DL
CAST: 0 /LPF
CHLORIDE SERPL-SCNC: 101 MMOL/L
CO2 SERPL-SCNC: 24 MMOL/L
COLOR: YELLOW
CREAT SERPL-MCNC: 0.77 MG/DL
EGFRCR SERPLBLD CKD-EPI 2021: 108 ML/MIN/1.73M2
EOSINOPHIL # BLD AUTO: 0.3 K/UL
EOSINOPHIL NFR BLD AUTO: 4 %
EPITHELIAL CELLS: 2 /HPF
GLUCOSE QUALITATIVE U: NEGATIVE MG/DL
GLUCOSE SERPL-MCNC: 83 MG/DL
HCT VFR BLD CALC: 41.9 %
HGB BLD-MCNC: 13.4 G/DL
IMM GRANULOCYTES NFR BLD AUTO: 0.4 %
KETONES URINE: NEGATIVE MG/DL
LEUKOCYTE ESTERASE URINE: NEGATIVE
LYMPHOCYTES # BLD AUTO: 2.93 K/UL
LYMPHOCYTES NFR BLD AUTO: 38.6 %
MAN DIFF?: NORMAL
MCHC RBC-ENTMCNC: 29.2 PG
MCHC RBC-ENTMCNC: 32 G/DL
MCV RBC AUTO: 91.3 FL
MICROSCOPIC-UA: NORMAL
MONOCYTES # BLD AUTO: 0.46 K/UL
MONOCYTES NFR BLD AUTO: 6.1 %
NEUTROPHILS # BLD AUTO: 3.83 K/UL
NEUTROPHILS NFR BLD AUTO: 50.4 %
NITRITE URINE: NEGATIVE
PH URINE: 7
PLATELET # BLD AUTO: 385 K/UL
POTASSIUM SERPL-SCNC: 4.9 MMOL/L
PROTEIN URINE: NEGATIVE MG/DL
RBC # BLD: 4.59 M/UL
RBC # FLD: 13.5 %
RED BLOOD CELLS URINE: 3 /HPF
SODIUM SERPL-SCNC: 140 MMOL/L
SPECIFIC GRAVITY URINE: 1.01
UROBILINOGEN URINE: 0.2 MG/DL
WBC # FLD AUTO: 7.59 K/UL
WHITE BLOOD CELLS URINE: 0 /HPF

## 2025-05-20 ENCOUNTER — NON-APPOINTMENT (OUTPATIENT)
Age: 30
End: 2025-05-20

## 2025-05-20 LAB — BACTERIA UR CULT: NORMAL

## 2025-05-21 ENCOUNTER — APPOINTMENT (OUTPATIENT)
Dept: INTERNAL MEDICINE | Facility: CLINIC | Age: 30
End: 2025-05-21

## 2025-07-03 ENCOUNTER — APPOINTMENT (OUTPATIENT)
Dept: HUMAN REPRODUCTION | Facility: CLINIC | Age: 30
End: 2025-07-03
Payer: COMMERCIAL

## 2025-07-03 PROCEDURE — 99215 OFFICE O/P EST HI 40 MIN: CPT | Mod: 95
